# Patient Record
Sex: MALE | Race: WHITE | NOT HISPANIC OR LATINO | Employment: OTHER | URBAN - METROPOLITAN AREA
[De-identification: names, ages, dates, MRNs, and addresses within clinical notes are randomized per-mention and may not be internally consistent; named-entity substitution may affect disease eponyms.]

---

## 2020-02-03 ENCOUNTER — HOSPITAL ENCOUNTER (OUTPATIENT)
Dept: GENERAL RADIOLOGY | Facility: HOSPITAL | Age: 60
Discharge: HOME | End: 2020-02-03
Admitting: INTERNAL MEDICINE

## 2020-02-03 ENCOUNTER — OFFICE VISIT (OUTPATIENT)
Dept: CARDIOLOGY | Facility: CLINIC | Age: 60
End: 2020-02-03

## 2020-02-03 VITALS
SYSTOLIC BLOOD PRESSURE: 132 MMHG | BODY MASS INDEX: 27.11 KG/M2 | HEIGHT: 69 IN | HEART RATE: 69 BPM | DIASTOLIC BLOOD PRESSURE: 72 MMHG | WEIGHT: 183 LBS

## 2020-02-03 DIAGNOSIS — R93.89 ABNORMAL CHEST X-RAY: Primary | ICD-10-CM

## 2020-02-03 DIAGNOSIS — R06.09 DOE (DYSPNEA ON EXERTION): Primary | ICD-10-CM

## 2020-02-03 DIAGNOSIS — R06.09 DOE (DYSPNEA ON EXERTION): ICD-10-CM

## 2020-02-03 DIAGNOSIS — E78.2 MIXED HYPERLIPIDEMIA: ICD-10-CM

## 2020-02-03 PROCEDURE — 99204 OFFICE O/P NEW MOD 45 MIN: CPT | Performed by: INTERNAL MEDICINE

## 2020-02-03 PROCEDURE — 71046 X-RAY EXAM CHEST 2 VIEWS: CPT

## 2020-02-03 PROCEDURE — 93000 ELECTROCARDIOGRAM COMPLETE: CPT | Performed by: INTERNAL MEDICINE

## 2020-02-03 RX ORDER — ATORVASTATIN CALCIUM 20 MG/1
20 TABLET, FILM COATED ORAL DAILY
COMMUNITY
Start: 2020-01-22

## 2020-02-03 NOTE — PROGRESS NOTES
Victor Hugo Ibarra  1960  Date of Office Visit: 02/03/20  Encounter Provider: Leighton Smith MD  Place of Service: [facility]      CHIEF COMPLAINT:  Dyspnea on exertion    HISTORY OF PRESENT ILLNESS:A 59-year-old male with a medical history of hyperlipidemia and a family history of hyperlipidemia along with heart failure who presents to me for dyspnea on exertion. He states that over the past 6 months that he has noticed increased dyspnea on exertion that is at least moderate in intensity. He states that when he walks up an incline to his shed, less than a couple hundred feet, he has dyspnea requiring him to rest. He recovers pretty quickly, typically in about 1-2 minutes. He denies any chest pain or pressure associated with that. He has no orthopnea or PND. He denies edema.    Review of Systems   Constitution: Negative for fever and malaise/fatigue.   HENT: Negative for nosebleeds and sore throat.    Eyes: Negative for blurred vision and double vision.   Cardiovascular: Positive for dyspnea on exertion. Negative for chest pain, claudication, palpitations and syncope.   Respiratory: Negative for cough, shortness of breath and snoring.    Endocrine: Negative for cold intolerance, heat intolerance and polydipsia.   Skin: Negative for itching, poor wound healing and rash.   Musculoskeletal: Negative for joint pain, joint swelling, muscle weakness and myalgias.   Gastrointestinal: Negative for abdominal pain, melena, nausea and vomiting.   Neurological: Negative for light-headedness, loss of balance, seizures, vertigo and weakness.   Psychiatric/Behavioral: Negative for altered mental status and depression.          Past Medical History:   Diagnosis Date   • Atypical chest pain    • NGUYEN (dyspnea on exertion)    • Insulin resistance    • Mixed hyperlipidemia        The following portions of the patient's history were reviewed and updated as appropriate: Social history ,  "Family history and Surgical history     Current Outpatient Medications on File Prior to Visit   Medication Sig Dispense Refill   • atorvastatin (LIPITOR) 20 MG tablet Take 20 mg by mouth Daily.       No current facility-administered medications on file prior to visit.        Allergies   Allergen Reactions   • Penicillins Anaphylaxis     Passed out. childhood       Vitals:    02/03/20 1306   BP: 132/72   Pulse: 69   Weight: 83 kg (183 lb)   Height: 175.3 cm (69\")     Physical Exam   Constitutional: He is oriented to person, place, and time. He appears well-developed and well-nourished.   HENT:   Head: Normocephalic and atraumatic.   Eyes: Conjunctivae and EOM are normal. No scleral icterus.   Neck: Normal range of motion. Neck supple. Normal carotid pulses, no hepatojugular reflux and no JVD present. Carotid bruit is not present. No tracheal deviation present. No thyromegaly present.   Cardiovascular: Normal rate and regular rhythm. Exam reveals no gallop and no friction rub.   No murmur heard.  Pulses:       Carotid pulses are 2+ on the right side, and 2+ on the left side.       Radial pulses are 2+ on the right side, and 2+ on the left side.        Femoral pulses are 2+ on the right side, and 2+ on the left side.       Dorsalis pedis pulses are 2+ on the right side, and 2+ on the left side.        Posterior tibial pulses are 2+ on the right side, and 2+ on the left side.   Pulmonary/Chest: Breath sounds normal. No respiratory distress. He has no decreased breath sounds. He has no wheezes. He has no rhonchi. He has no rales. He exhibits no tenderness.   Abdominal: Soft. Bowel sounds are normal. He exhibits no distension. There is no tenderness. There is no rebound.   Musculoskeletal: He exhibits no edema or deformity.   Neurological: He is alert and oriented to person, place, and time. He has normal strength. No sensory deficit.   Skin: No rash noted. No erythema.   Psychiatric: He has a normal mood and affect. His " behavior is normal.          Lab Results   Component Value Date    WBC 11.61 (H) 01/22/2020    HGB 14.7 01/22/2020    HCT 45.2 01/22/2020    MCV 93.0 01/22/2020     01/22/2020       Lab Results   Component Value Date    BUN 25 (H) 01/22/2020    CREATININE 1.2 01/22/2020    BCR 22.0 (H) 01/22/2020    K 4.9 01/22/2020    CO2 29 01/22/2020    CALCIUM 9.7 01/22/2020    ALBUMIN 4.3 01/22/2020    LABIL2 1.1 01/22/2020    AST 19 01/22/2020    ALT 32 01/22/2020       Lab Results   Component Value Date    CALCIUM 9.7 01/22/2020     01/22/2020    K 4.9 01/22/2020    CO2 29 01/22/2020    CL 95 (L) 01/22/2020    BUN 25 (H) 01/22/2020    CREATININE 1.2 01/22/2020    BCR 22.0 (H) 01/22/2020       ECG 12 Lead  Date/Time: 2/3/2020 1:48 PM  Performed by: Leighton Smith MD  Authorized by: Leighton Smith MD   Comparison: not compared with previous ECG   Rhythm: sinus rhythm  Rate: normal  QRS axis: normal    Clinical impression: normal ECG                  DISCUSSION/SUMMARYVery pleasant 59-year-old male presents to me for evaluation of dyspnea on exertion. He has no evidence of volume overload. He does not carry a diagnosis of coronary artery disease but does have a family history and does personally have hyperlipidemia.    I think that we need to take a broad approach here and evaluate him for structural heart disease but also ischemia with an active and a standard treadmill stress test. I will also get him set up for a chest x-ray in the setting of his dyspnea.    Hyperlipidemia: Continue atorvastatin therapy. I think that is appropriate at this point in time.    I will touch base with him after his testing is complete.

## 2020-02-03 NOTE — PROGRESS NOTES
I do worry a little bit that there is some fibrosis involved here.  He has significant shortness of breath and no evidence of volume overload clinically.  I think he probably needs a high-resolution CT scan, however again to go ahead and refer him over to Dr. Aurelio Recio and get his opinion as well prior to ordering

## 2020-03-19 ENCOUNTER — TELEPHONE (OUTPATIENT)
Dept: CARDIOLOGY | Facility: CLINIC | Age: 60
End: 2020-03-19

## 2020-03-19 NOTE — TELEPHONE ENCOUNTER
I am sorry.  This is a nonurgent CT scan and we have been ordered by [facility] to postpone these.  I am sorry but I agree with pulmonary here

## 2020-03-19 NOTE — TELEPHONE ENCOUNTER
L/M advising pt that Dr. Smith has recommended he follow Pulmonology instructs and postpone CT Scan, as it was ordered per [facility] to postpone these procedures. Advised pt to call office w/ any further questions or concerns.    ABIEL Ochoa

## 2024-05-22 NOTE — TELEPHONE ENCOUNTER
Per patient and patients mother patient was able to void post madden x2. Estimated amount 500ml, hat was dumped prior to RN assessing. Patient verbalized it was a full void each time.   S/W pt re: CT Scan. Pt stated when he was scheduled w/ Pulmonology they schedule for a mth out. Pt then stated he received a call from Pulmonology cancelling his CT Scan due to Coronavirus. Pt requested possibly Dr. Smith place an order for his CT Scan, as he really wants to know what it could possibly show.   Please advise of recommendations.    ABIEL Ochoa